# Patient Record
(demographics unavailable — no encounter records)

---

## 2025-04-01 NOTE — HISTORY OF PRESENT ILLNESS
[FreeTextEntry1] : 46 Y/M with PMHx of HTN, HLD palpitations (w/ MCOT showing no arrythmias) p/w f/u. On last visit pt was referred to follow w/ a PCP, and was referred for a TTE wish was done 2/14/25, which showed LVEF 67%.  Currently patient is stable denies active symptoms, report occasional atypical chest pain currently resolved  Reports not taking their amlodipine today. Denies seeing a PCP even though a referral was sent on the last visit.  EKG sinus rhythm no acute ischemic changes

## 2025-04-01 NOTE — REVIEW OF SYSTEMS
[Fever] : no fever [SOB] : no shortness of breath [Cough] : no cough [Abdominal Pain] : no abdominal pain [Pain During Urination] : no dysuria [FreeTextEntry5] : Palpitations on exertion, associated with left sided non radiation chest pain , alleviated w/ rest

## 2025-04-01 NOTE — ASSESSMENT
[FreeTextEntry1] : 46 Y/M with PMHx of HTN, HLD palpitations (w/ MCOT showing no arrythmias) p/w f/u  # Palpitations,  - MCOT: No arrythmias - EKG: NSR on 4/1/25  - No current cp or palpitations - TTE  2/14/25 LVEF 67%. - TSH wnl   # HTN - Was started on Norvasc 10 on last visit for bp 161/11, today's bp 143/93, reports not takin their medication today  - will c/w amlodipine, medication compliance advised  #HLD -  02/2025 - advised on helahty dietary habits  PMD follow up   RTC in 4 months if new or worsening symptoms patient advised to seek medical attention .